# Patient Record
Sex: MALE | Race: BLACK OR AFRICAN AMERICAN
[De-identification: names, ages, dates, MRNs, and addresses within clinical notes are randomized per-mention and may not be internally consistent; named-entity substitution may affect disease eponyms.]

---

## 2019-01-10 ENCOUNTER — HOSPITAL ENCOUNTER (EMERGENCY)
Dept: HOSPITAL 61 - ER | Age: 39
Discharge: HOME | End: 2019-01-10
Payer: COMMERCIAL

## 2019-01-10 VITALS — WEIGHT: 280 LBS | BODY MASS INDEX: 35.94 KG/M2 | HEIGHT: 74 IN

## 2019-01-10 VITALS — SYSTOLIC BLOOD PRESSURE: 137 MMHG | DIASTOLIC BLOOD PRESSURE: 72 MMHG
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
## 2019-01-10 DIAGNOSIS — Z91.013: ICD-10-CM

## 2019-01-10 DIAGNOSIS — S83.8X1A: Primary | ICD-10-CM
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
## 2019-01-10 PROCEDURE — 99283 EMERGENCY DEPT VISIT LOW MDM: CPT

## 2019-01-10 PROCEDURE — 73562 X-RAY EXAM OF KNEE 3: CPT

## 2019-01-10 NOTE — PHYS DOC
Adult General


Chief Complaint


Chief Complaint:  KNEE INJURY





[facility]


HPI





Patient is a 38  year old male presented to ER today for evaluation of right 

knee pain and swelling after he injured it playing basketball two days ago.  

Patient said he twisted.  He complaint of pain in his right knee when he walks 

or put any pressure on his right leg.  He denies any previous injury to her 

right knee.





Review of Systems


Review of Systems





Constitutional: Denies fever or chills []


Eyes: Denies change in visual acuity, redness, or eye pain []


HENT: Denies nasal congestion or sore throat []


Respiratory: Denies cough or shortness of breath []


Cardiovascular: No additional information not addressed in HPI []


GI: Denies abdominal pain, nausea, vomiting, bloody stools or diarrhea []


: Denies dysuria or hematuria []


Musculoskeletal: POSITIVE FOR RIGHT KNEE PAIN AND SWELLING.  


Integument: Denies rash or skin lesions []


Neurologic: Denies headache, focal weakness or sensory changes []


Endocrine: Denies polyuria or polydipsia []





All other systems were reviewed and found to be within normal limits, except as 

documented in this note.





Allergies


Allergies





Allergies








Coded Allergies Type Severity Reaction Last Updated Verified


 


  shellfish derived Allergy Severe Anaphylaxis 1/10/19 Yes











Physical Exam


Physical Exam





Constitutional: Well developed, well nourished, no acute distress, non-toxic 

appearance. []


HENT: Normocephalic, atraumatic, bilateral external ears normal, oropharynx 

moist, no oral exudates, nose normal. []


Eyes: PERRLA, EOMI, conjunctiva normal, no discharge. [] 


Neck: Normal range of motion, no tenderness, supple, no stridor. [] 


Cardiovascular:Heart rate regular rhythm, no murmur []


Lungs & Thorax:  Bilateral breath sounds clear to auscultation []


Abdomen: Bowel sounds normal, soft, no tenderness, no masses, no pulsatile 

masses. [] 


Skin: Warm, dry, no erythema, no rash. [] 


Back: No tenderness, no CVA tenderness. [] 


Extremities:  Right knee is tender with swelling.  No erythema.  Right knee 

joint is stable. 


Neurologic: Alert and oriented X 3, normal motor function, normal sensory 

function, no focal deficits noted. []


Psychologic: Affect normal, judgement normal, mood normal. []





Current Patient Data


Vital Signs





 Vital Signs








  Date Time  Temp Pulse Resp B/P (MAP) Pulse Ox O2 Delivery O2 Flow Rate FiO2


 


1/10/19 11:25 98.7 95 18 137/72 (93) 97 Room Air  





 98.7       











EKG


EKG


[]





Radiology/Procedures


Radiology/Procedures


[]IMAGING REPORT





 Signed





PATIENT: ZACHARY BELL ACCOUNT: [id number] MRN#: [id number]


: 1980 LOCATION: ER AGE: 38


SEX: M EXAM DT: 01/10/19 ACCESSION#: [id number]


STATUS: REG ER ORD. PHYSICIAN: SOSA CLIFFORD DO 


REASON: right knee pain, swelling after twisting it two days ago


PROCEDURE: KNEE RIGHT 3V





EXAM: Right knee, 3 views.


 


HISTORY: Twisting injury.


 


COMPARISON: None.


 


FINDINGS: 3 views of the right knee are obtained. There is no fracture, 


dislocation or subluxation. There is minimal lateral compartment spurring.


There is a small suprapatellar effusion.


 


IMPRESSION: 


1. Small right knee effusion.


2. Minimal right knee osteoarthritis.


 


Electronically signed by: Lucia Gomez MD (1/10/2019 12:21 PM) [facility]














DICTATED and SIGNED BY:     LUCIA GOMEZ MD


DATE:     01/10/19 1221





Course & Med Decision Making


Course & Med Decision Making


Pertinent Labs and Imaging studies reviewed. (See chart for details)





[]





Dragon Disclaimer


Dragon Disclaimer


This electronic medical record was generated, in whole or in part, using a 

voice recognition dictation system.





Departure


Departure


Impression:  


 Primary Impression:  


 Sprain of right knee


Disposition:   HOME, SELF-CARE


Condition:  STABLE


Referrals:  


MARIANA MACKENZIE MD


please call this orthopedic surgeon for further evaluation next week.


Patient Instructions:  Knee Sprain


Scripts


Tramadol Hcl (TRAMADOL HCL) 50 Mg Tablet


50 MG PO Q6HRS PRN for PAIN, #20 TAB


   Prov: SOSA CLIFFORD DO         1/10/19











SOSA CLIFFORD DO Barry 10, 2019 11:42

## 2019-01-10 NOTE — RAD
EXAM: Right knee, 3 views.

 

HISTORY: Twisting injury.

 

COMPARISON: None.

 

FINDINGS: 3 views of the right knee are obtained. There is no fracture, 

dislocation or subluxation. There is minimal lateral compartment spurring.

There is a small suprapatellar effusion.

 

IMPRESSION: 

1. Small right knee effusion.

2. Minimal right knee osteoarthritis.

 

Electronically signed by: Lucia Leung MD (1/10/2019 12:21 PM) [facility]

## 2020-06-22 ENCOUNTER — HOSPITAL ENCOUNTER (EMERGENCY)
Dept: HOSPITAL 61 - ER | Age: 40
Discharge: HOME | End: 2020-06-22
Payer: COMMERCIAL

## 2020-06-22 VITALS — SYSTOLIC BLOOD PRESSURE: 148 MMHG | DIASTOLIC BLOOD PRESSURE: 85 MMHG

## 2020-06-22 VITALS — WEIGHT: 315 LBS | BODY MASS INDEX: 40.43 KG/M2 | HEIGHT: 74 IN

## 2020-06-22 DIAGNOSIS — S52.124A: Primary | ICD-10-CM

## 2020-06-22 DIAGNOSIS — S39.012A: ICD-10-CM

## 2020-06-22 DIAGNOSIS — F17.200: ICD-10-CM
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
## 2020-06-22 DIAGNOSIS — Z91.013: ICD-10-CM
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
## 2020-06-22 DIAGNOSIS — S13.9XXA: ICD-10-CM

## 2020-06-22 PROCEDURE — 72100 X-RAY EXAM L-S SPINE 2/3 VWS: CPT

## 2020-06-22 PROCEDURE — A4565 SLINGS: HCPCS

## 2020-06-22 PROCEDURE — 29505 APPLICATION LONG LEG SPLINT: CPT

## 2020-06-22 PROCEDURE — 73080 X-RAY EXAM OF ELBOW: CPT

## 2020-06-22 PROCEDURE — 99284 EMERGENCY DEPT VISIT MOD MDM: CPT

## 2020-06-22 PROCEDURE — 29105 APPLICATION LONG ARM SPLINT: CPT

## 2020-06-22 PROCEDURE — 72040 X-RAY EXAM NECK SPINE 2-3 VW: CPT
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
